# Patient Record
Sex: MALE | Race: BLACK OR AFRICAN AMERICAN | NOT HISPANIC OR LATINO | URBAN - METROPOLITAN AREA
[De-identification: names, ages, dates, MRNs, and addresses within clinical notes are randomized per-mention and may not be internally consistent; named-entity substitution may affect disease eponyms.]

---

## 2024-05-21 ENCOUNTER — OFFICE (OUTPATIENT)
Dept: URBAN - METROPOLITAN AREA CLINIC 10 | Facility: CLINIC | Age: 69
End: 2024-05-21

## 2024-05-21 VITALS
WEIGHT: 190 LBS | HEIGHT: 71 IN | OXYGEN SATURATION: 98 % | DIASTOLIC BLOOD PRESSURE: 72 MMHG | HEART RATE: 53 BPM | SYSTOLIC BLOOD PRESSURE: 115 MMHG

## 2024-05-21 DIAGNOSIS — R19.7 DIARRHEA, UNSPECIFIED: ICD-10-CM

## 2024-05-21 DIAGNOSIS — K21.9 GASTRO-ESOPHAGEAL REFLUX DISEASE WITHOUT ESOPHAGITIS: ICD-10-CM

## 2024-05-21 PROCEDURE — 99204 OFFICE O/P NEW MOD 45 MIN: CPT | Performed by: NURSE PRACTITIONER

## 2024-05-21 NOTE — SERVICEHPINOTES
Mr. Patton is an 68-year-old male. He is here today for 1st visit referred to me by Rosario demarco NP with chief complaint fecal incontinence. States he had an episode where while he was trying to urinate he had some liquid stool leak out at that time. States this has not happened since he initially mentioned it to his PCP. He reports 1-2 soft bowel movements per day as his usual pattern. No overt GI bleeding. No abdominal pain. He takes famotidine 40 mg daily for reflux which he says works very well for him. No dysphagia. No nausea or vomiting. He describes weight loss is having lost 1 lb over the past 3-4 months. States his last colonoscopy was 20 years ago. He has no GI complaints today.

## 2024-05-21 NOTE — SERVICENOTES
Explained risks and benefits of colon screening.  He is declining endoscopy or colonoscopy at this time.  No red flags.